# Patient Record
Sex: MALE | Race: WHITE
[De-identification: names, ages, dates, MRNs, and addresses within clinical notes are randomized per-mention and may not be internally consistent; named-entity substitution may affect disease eponyms.]

---

## 2017-04-13 ENCOUNTER — HOSPITAL ENCOUNTER (EMERGENCY)
Age: 52
Discharge: HOME | End: 2017-04-13
Payer: MEDICARE

## 2017-04-13 ENCOUNTER — HOSPITAL ENCOUNTER (OUTPATIENT)
Age: 52
Discharge: HOME | End: 2017-04-13
Payer: MEDICARE
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
## 2017-04-13 DIAGNOSIS — T17.928A: Primary | ICD-10-CM

## 2017-04-13 DIAGNOSIS — S20.211A: ICD-10-CM

## 2017-04-13 DIAGNOSIS — Q90.9: ICD-10-CM

## 2017-04-13 PROCEDURE — 99283 EMERGENCY DEPT VISIT LOW MDM: CPT

## 2017-04-13 PROCEDURE — 99282 EMERGENCY DEPT VISIT SF MDM: CPT

## 2017-04-13 PROCEDURE — 71101 X-RAY EXAM UNILAT RIBS/CHEST: CPT

## 2017-06-13 ENCOUNTER — HOSPITAL ENCOUNTER (OUTPATIENT)
Dept: HOSPITAL 76 - DI | Age: 52
Discharge: HOME | End: 2017-06-13
Attending: INTERNAL MEDICINE
Payer: MEDICARE

## 2017-06-13 DIAGNOSIS — R09.89: Primary | ICD-10-CM

## 2017-06-13 DIAGNOSIS — Q90.9: ICD-10-CM

## 2017-06-13 PROCEDURE — 92611 MOTION FLUOROSCOPY/SWALLOW: CPT

## 2017-06-13 PROCEDURE — 74230 X-RAY XM SWLNG FUNCJ C+: CPT

## 2017-06-13 NOTE — XRAY REPORT
MODIFIED BARIUM SWALLOW:  06/13/2017

 

CLINICAL INDICATION:  Down syndrome, choking. 

 

FINDINGS:  Various consistencies of barium were prepared and administered in conjunction with Speech 
Pathology.  There was no evidence of penetration or aspiration with any administered consistency.  Pl
ease also refer to full report from Speech Pathology for further findings. 

 

IMPRESSION:  NO EVIDENCE OF PENETRATION OR ASPIRATION.

 

FLUOROSCOPY TIME:  40 seconds; 1 spot image obtained (cinefluoroscopy recorded). 

 

 

 

DD:06/13/2017 11:36:39  DT: 06/13/2017 11:51  JOB #: [id number]  EXT JOB #:[id number]

## 2018-02-14 ENCOUNTER — HOSPITAL ENCOUNTER (OUTPATIENT)
Dept: HOSPITAL 76 - LAB.R | Age: 53
Discharge: HOME | End: 2018-02-14
Attending: INTERNAL MEDICINE
Payer: MEDICARE

## 2018-02-14 DIAGNOSIS — E53.8: Primary | ICD-10-CM

## 2018-02-14 DIAGNOSIS — Z79.899: ICD-10-CM

## 2018-02-14 DIAGNOSIS — Q90.9: ICD-10-CM

## 2018-02-14 DIAGNOSIS — E03.9: ICD-10-CM

## 2018-02-14 DIAGNOSIS — D70.9: ICD-10-CM

## 2018-02-14 LAB
ALBUMIN DIAFP-MCNC: 4.2 G/DL (ref 3.2–5.5)
ALBUMIN/GLOB SERPL: 1.4 {RATIO} (ref 1–2.2)
ALP SERPL-CCNC: 50 IU/L (ref 42–121)
ALT SERPL W P-5'-P-CCNC: 16 IU/L (ref 10–60)
ANION GAP SERPL CALCULATED.4IONS-SCNC: 11 MMOL/L (ref 6–13)
AST SERPL W P-5'-P-CCNC: 23 IU/L (ref 10–42)
BASOPHILS # BLD MANUAL: 0.1 10^3/UL (ref 0–0.1)
BASOPHILS NFR BLD AUTO: 0.7 %
BASOPHILS NFR SPEC MANUAL: 4 %
BILIRUB BLD-MCNC: 1.8 MG/DL (ref 0.2–1)
BUN SERPL-MCNC: 14 MG/DL (ref 6–20)
CALCIUM UR-MCNC: 9.3 MG/DL (ref 8.5–10.3)
CHLORIDE SERPL-SCNC: 98 MMOL/L (ref 101–111)
CHOLEST SERPL-MCNC: 163 MG/DL
CO2 SERPL-SCNC: 30 MMOL/L (ref 21–32)
CREAT SERPLBLD-SCNC: 1 MG/DL (ref 0.6–1.2)
EOSINOPHIL # BLD MANUAL: 0 10^3/UL (ref 0–0.7)
EOSINOPHIL NFR BLD AUTO: 0.8 %
ERYTHROCYTE [DISTWIDTH] IN BLOOD BY AUTOMATED COUNT: 13 % (ref 12–15)
GFRSERPLBLD MDRD-ARVRAT: 79 ML/MIN/{1.73_M2} (ref 89–?)
GLOBULIN SER-MCNC: 2.9 G/DL (ref 2.1–4.2)
GLUCOSE SERPL-MCNC: 97 MG/DL (ref 70–100)
HDLC SERPL-MCNC: 60 MG/DL
HDLC SERPL: 2.7 {RATIO} (ref ?–5)
HGB UR QL STRIP: 16.5 G/DL (ref 14–18)
LDLC SERPL CALC-MCNC: 86 MG/DL
LDLC/HDLC SERPL: 1.4 {RATIO} (ref ?–3.6)
LYMPH ABN NFR BLD MANUAL: 0 %
LYMPHOBLASTS # BLD: 20 %
LYMPHOCYTES # BLD MANUAL: 0.9 10^3/UL (ref 1.5–3.5)
LYMPHOCYTES NFR BLD AUTO: 43.7 %
MANUAL DIF COMMENT BLD-IMP: (no result)
MCH RBC QN AUTO: 37.1 PG (ref 27–31)
MCHC RBC AUTO-ENTMCNC: 34.7 G/DL (ref 32–36)
MCV RBC AUTO: 107 FL (ref 80–94)
MONOCYTES # BLD MANUAL: 0.3 10^3/UL (ref 0–1)
MONOCYTES NFR BLD AUTO: 12.6 %
NEUTROPHILS # SNV AUTO: 2.6 X10^3/UL (ref 4.8–10.8)
NEUTROPHILS NFR BLD AUTO: 42.2 %
NEUTROPHILS NFR BLD MANUAL: 1.2 10^3/UL (ref 1.5–6.6)
NEUTS BAND NFR BLD MANUAL: 47 %
NEUTS BAND NFR BLD: 0 %
PDW BLD AUTO: 8.1 FL (ref 7.4–11.4)
PLATELET # BLD: 162 10^3/UL (ref 130–450)
PROT SPEC-MCNC: 7.1 G/DL (ref 6.7–8.2)
RBC MAR: 4.44 10^6/UL (ref 4.7–6.1)
SODIUM SERPLBLD-SCNC: 139 MMOL/L (ref 135–145)
VLDLC SERPL-SCNC: 17 MG/DL

## 2018-02-14 PROCEDURE — 80053 COMPREHEN METABOLIC PANEL: CPT

## 2018-02-14 PROCEDURE — 83721 ASSAY OF BLOOD LIPOPROTEIN: CPT

## 2018-02-14 PROCEDURE — 84443 ASSAY THYROID STIM HORMONE: CPT

## 2018-02-14 PROCEDURE — 85025 COMPLETE CBC W/AUTO DIFF WBC: CPT

## 2018-02-14 PROCEDURE — 80061 LIPID PANEL: CPT

## 2018-11-09 ENCOUNTER — HOSPITAL ENCOUNTER (EMERGENCY)
Dept: HOSPITAL 76 - ED | Age: 53
Discharge: HOME | End: 2018-11-09
Payer: MEDICARE

## 2018-11-09 VITALS — SYSTOLIC BLOOD PRESSURE: 132 MMHG | DIASTOLIC BLOOD PRESSURE: 89 MMHG

## 2018-11-09 DIAGNOSIS — Q90.9: ICD-10-CM

## 2018-11-09 DIAGNOSIS — K59.00: Primary | ICD-10-CM

## 2018-11-09 LAB
CLARITY UR REFRACT.AUTO: CLEAR
GLUCOSE UR QL STRIP.AUTO: NEGATIVE MG/DL
KETONES UR QL STRIP.AUTO: NEGATIVE MG/DL
NITRITE UR QL STRIP.AUTO: NEGATIVE
PH UR STRIP.AUTO: 7 PH (ref 5–7.5)
PROT UR STRIP.AUTO-MCNC: NEGATIVE MG/DL
RBC # UR STRIP.AUTO: NEGATIVE /UL
SP GR UR STRIP.AUTO: 1.01 (ref 1–1.03)
UROBILINOGEN UR QL STRIP.AUTO: (no result) E.U./DL
UROBILINOGEN UR STRIP.AUTO-MCNC: NEGATIVE MG/DL

## 2018-11-09 PROCEDURE — 74018 RADEX ABDOMEN 1 VIEW: CPT

## 2018-11-09 PROCEDURE — 81001 URINALYSIS AUTO W/SCOPE: CPT

## 2018-11-09 PROCEDURE — 81003 URINALYSIS AUTO W/O SCOPE: CPT

## 2018-11-09 PROCEDURE — 87086 URINE CULTURE/COLONY COUNT: CPT

## 2018-11-09 PROCEDURE — 99283 EMERGENCY DEPT VISIT LOW MDM: CPT

## 2018-11-09 PROCEDURE — 51798 US URINE CAPACITY MEASURE: CPT

## 2018-11-09 NOTE — XRAY REPORT
Reason:  supra pubic pain ? stool load

Procedure Date:  11/09/2018   

Accession Number:  [id number] / [id number]                    

Procedure:  XR  - Abdomen 1 View X-Ray CPT Code:  56866

 

FULL RESULT:

 

 

EXAM:

ABDOMEN RADIOGRAPHY

 

EXAM DATE: 11/9/2018 10:29 PM.

 

CLINICAL HISTORY: Supra pubic pain ? Stool load.

 

COMPARISON: None.

 

TECHNIQUE: 1 view.

 

FINDINGS:

Bowel Gas Pattern: Nonobstructive with moderate stool burden.

 

Other: Mild scoliosis.

 

IMPRESSION: Moderate stool burden.

 

RADIA

## 2018-11-09 NOTE — ED PHYSICIAN DOCUMENTATION
PD HPI ABD PAIN





- Stated complaint


Stated Complaint: ABD PX/BK PX/NECK PX





- Chief complaint


Chief Complaint: Abd Pain





- History obtained from


History obtained from: Patient, Family





- History of Present Illness


Timing - onset: Today


Timing - duration: Minutes


Timing - details: Abrupt onset, Still present


Quality: Cramping, Sharp, Pain


Location: Suprapubic


Improved by: Laying still


Associated symptoms: No: Fever, Nausea, Vomiting, Hematemesis, Diarrhea, 

Constipation


Similar symptoms before: Has not had sx before


Recently seen: Not recently seen





- Additional information


Additional information: 





53-year-old male with Down syndrome was at home this evening with his mother 

they were getting ready to go to bed when he complained of severe suprapubic 

pain.  He does not usually complain about anything and the mother has become 

concerned and has brought him here to the hospital.  He seems more comfortable 

now.  The patient himself is a poor historian and usually refers to his mother 

for any details.  He does indicate that he had a bowel movement today and that 

he has been able to urinate.  He is not specific on any details.





Review of Systems


Constitutional: denies: Fever


Eyes: denies: Decreased vision


Ears: denies: Ear pain


Nose: denies: Congestion


Throat: denies: Sore throat


Cardiac: denies: Chest pain / pressure, Palpitations


Respiratory: denies: Dyspnea, Cough


GI: reports: Abdominal Pain.  denies: Nausea, Vomiting, Constipation, Diarrhea


: reports: Incontinent.  denies: Dysuria, Frequency


Skin: denies: Rash


Musculoskeletal: denies: Neck pain





PD PAST MEDICAL HISTORY





- Present Medications


Home Medications: 


                                Ambulatory Orders











 Medication  Instructions  Recorded  Confirmed


 


Levothyroxine Sodium [Synthroid]  11/09/18 














- Allergies


Allergies/Adverse Reactions: 


                                    Allergies











Allergy/AdvReac Type Severity Reaction Status Date / Time


 


ampicillin Allergy  Unknown Verified 11/09/18 22:01














PD ED PE NORMAL





- Vitals


Vital signs reviewed: Yes (diastolic hypertension )





- General


General: No acute distress, Well developed/nourished





- HEENT


HEENT: Atraumatic, PERRL, EOMI





- Neck


Neck: Supple, no meningeal sign





- Cardiac


Cardiac: RRR, No murmur





- Respiratory


Respiratory: No respiratory distress, Clear bilaterally





- Abdomen


Abdomen: Soft, Non tender, Other (bladder is palpable but not tender at the time

 )





- Back


Back: No CVA TTP, No spinal TTP





- Derm


Derm: Normal color, Warm and dry, No rash





- Extremities


Extremities: No deformity, No edema





- Neuro


Neuro: CNs 2-12 intact, No motor deficit, No sensory deficit, Normal speech


Eye Opening: Spontaneous


Motor: Obeys Commands


Verbal: Oriented


GCS Score: 15





- Psych


Psych: Normal mood, Normal affect





Results





- Vitals


Vitals: 


                               Vital Signs - 24 hr











  11/09/18





  21:50


 


Temperature 36.0 C L


 


Heart Rate 76


 


Respiratory 16





Rate 


 


Blood Pressure 127/91 H


 


O2 Saturation 100








                                     Oxygen











O2 Source                      Room air

















- Labs


Labs: 


                                Laboratory Tests











  11/09/18





  22:24


 


Urine Color  YELLOW


 


Urine Clarity  CLEAR


 


Urine pH  7.0


 


Ur Specific Gravity  1.015


 


Urine Protein  NEGATIVE


 


Urine Glucose (UA)  NEGATIVE


 


Urine Ketones  NEGATIVE


 


Urine Occult Blood  NEGATIVE


 


Urine Nitrite  NEGATIVE


 


Urine Bilirubin  NEGATIVE


 


Urine Urobilinogen  0.2 (NORMAL)


 


Ur Leukocyte Esterase  NEGATIVE


 


Ur Microscopic Review  NOT INDICATED


 


Urine Culture Comments  NOT INDICATED














- Rads (name of study)


  ** 1 view abdomen


Radiology: Prelim report reviewed (Impression: Moderate stool burden.), EMP read

 indepedently, See rad report





Procedures





- Bedside sono


Bedside sono by EMP: 





With with use of bedside ultrasound the bladder is imaged he does not appear to 

be tender but the bladder is full.  A bedside bladder scanner is used for 

quantitation with about 500 mls of urine present.





PD MEDICAL DECISION MAKING





- ED course


Complexity details: reviewed results, re-evaluated patient, considered 

differential, d/w patient, d/w family


ED course: 





53-year-old male with Down syndrome has developed suprapubic pain this evening 

that was severe enough that he asked his mother to come to the hospital.  On 

arrival to the emerge department he does not appear to be in any discomfort and 

on examination he has a nontender abdomen my initial concern was for the 

possibility of urinary retention as the patient did have a palpable bladder.  

This was shown to have about 500 mils of urine in it by bladder scanning and the

 patient was able to void over 300 mils an the urine was without evidence of 

infection.  The patient is unable to give an adequate history about his bowel 

movements and a 1 view x-ray of the abdomen shows a moderate stool burden.  I 

suspect his symptoms are due to constipation.





Departure





- Departure


Disposition: 01 Home, Self Care


Clinical Impression: 


Constipation


Qualifiers:


 Constipation type: unspecified constipation type Qualified Code(s): K59.00 - 

Constipation, unspecified





Condition: Stable


Instructions:  ED Constipation


Follow-Up: 


Helio Dsouza MD [Primary Care Provider] -

## 2018-11-17 ENCOUNTER — HOSPITAL ENCOUNTER (OUTPATIENT)
Dept: HOSPITAL 76 - EMS | Age: 53
End: 2018-11-17
Attending: SURGERY
Payer: MEDICARE

## 2018-11-17 DIAGNOSIS — R09.89: Primary | ICD-10-CM

## 2019-04-07 ENCOUNTER — HOSPITAL ENCOUNTER (OUTPATIENT)
Dept: HOSPITAL 76 - LAB | Age: 54
Discharge: HOME | End: 2019-04-07
Attending: FAMILY MEDICINE
Payer: MEDICARE

## 2019-04-07 ENCOUNTER — HOSPITAL ENCOUNTER (OUTPATIENT)
Dept: HOSPITAL 76 - LAB.R | Age: 54
Discharge: HOME | End: 2019-04-07
Attending: FAMILY MEDICINE
Payer: MEDICARE

## 2019-04-07 DIAGNOSIS — E03.9: ICD-10-CM

## 2019-04-07 DIAGNOSIS — E53.8: Primary | ICD-10-CM

## 2019-04-07 DIAGNOSIS — Q90.9: ICD-10-CM

## 2019-04-07 LAB
ALBUMIN DIAFP-MCNC: 3.9 G/DL (ref 3.2–5.5)
ALBUMIN/GLOB SERPL: 1.2 {RATIO} (ref 1–2.2)
ALP SERPL-CCNC: 51 IU/L (ref 42–121)
ALT SERPL W P-5'-P-CCNC: 19 IU/L (ref 10–60)
ANION GAP SERPL CALCULATED.4IONS-SCNC: 8 MMOL/L (ref 6–13)
AST SERPL W P-5'-P-CCNC: 23 IU/L (ref 10–42)
BASOPHILS NFR BLD AUTO: 0 10^3/UL (ref 0–0.1)
BASOPHILS NFR BLD AUTO: 0.2 %
BILIRUB BLD-MCNC: 1.8 MG/DL (ref 0.2–1)
BUN SERPL-MCNC: 14 MG/DL (ref 6–20)
CALCIUM UR-MCNC: 9 MG/DL (ref 8.5–10.3)
CHLORIDE SERPL-SCNC: 101 MMOL/L (ref 101–111)
CO2 SERPL-SCNC: 31 MMOL/L (ref 21–32)
CREAT SERPLBLD-SCNC: 1 MG/DL (ref 0.6–1.2)
EOSINOPHIL # BLD AUTO: 0 10^3/UL (ref 0–0.7)
EOSINOPHIL NFR BLD AUTO: 0.5 %
ERYTHROCYTE [DISTWIDTH] IN BLOOD BY AUTOMATED COUNT: 13.7 % (ref 12–15)
GFRSERPLBLD MDRD-ARVRAT: 78 ML/MIN/{1.73_M2} (ref 89–?)
GLOBULIN SER-MCNC: 3.2 G/DL (ref 2.1–4.2)
GLUCOSE SERPL-MCNC: 91 MG/DL (ref 70–100)
HGB UR QL STRIP: 16.3 G/DL (ref 14–18)
LYMPHOCYTES # SPEC AUTO: 1.4 10^3/UL (ref 1.5–3.5)
LYMPHOCYTES NFR BLD AUTO: 34.2 %
MCH RBC QN AUTO: 36.7 PG (ref 27–31)
MCHC RBC AUTO-ENTMCNC: 34.5 G/DL (ref 32–36)
MCV RBC AUTO: 106.4 FL (ref 80–94)
MONOCYTES # BLD AUTO: 0.4 10^3/UL (ref 0–1)
MONOCYTES NFR BLD AUTO: 9.4 %
NEUTROPHILS # BLD AUTO: 2.4 10^3/UL (ref 1.5–6.6)
NEUTROPHILS # SNV AUTO: 4.2 X10^3/UL (ref 4.8–10.8)
NEUTROPHILS NFR BLD AUTO: 55.7 %
PDW BLD AUTO: 8.1 FL (ref 7.4–11.4)
PLATELET # BLD: 163 10^3/UL (ref 130–450)
PROT SPEC-MCNC: 7.1 G/DL (ref 6.7–8.2)
RBC MAR: 4.44 10^6/UL (ref 4.7–6.1)
SODIUM SERPLBLD-SCNC: 140 MMOL/L (ref 135–145)
T4 FREE SERPL-MCNC: 0.95 NG/DL (ref 0.58–1.64)
TSH SERPL-ACNC: 1.17 UIU/ML (ref 0.34–5.6)

## 2019-04-07 PROCEDURE — 84439 ASSAY OF FREE THYROXINE: CPT

## 2019-04-07 PROCEDURE — 80053 COMPREHEN METABOLIC PANEL: CPT

## 2019-04-07 PROCEDURE — 84481 FREE ASSAY (FT-3): CPT

## 2019-04-07 PROCEDURE — 84443 ASSAY THYROID STIM HORMONE: CPT

## 2019-04-07 PROCEDURE — 85025 COMPLETE CBC W/AUTO DIFF WBC: CPT

## 2019-04-07 PROCEDURE — 36415 COLL VENOUS BLD VENIPUNCTURE: CPT

## 2021-04-22 ENCOUNTER — HOSPITAL ENCOUNTER (OUTPATIENT)
Dept: HOSPITAL 76 - EMS | Age: 56
End: 2021-04-22
Payer: MEDICARE

## 2021-04-22 DIAGNOSIS — Z03.89: Primary | ICD-10-CM

## 2021-04-27 ENCOUNTER — HOSPITAL ENCOUNTER (EMERGENCY)
Dept: HOSPITAL 76 - ED | Age: 56
LOS: 2 days | Discharge: HOME | End: 2021-04-29
Payer: MEDICARE

## 2021-04-27 ENCOUNTER — HOSPITAL ENCOUNTER (OUTPATIENT)
Dept: HOSPITAL 76 - EMS | Age: 56
Discharge: ANOTHER HOSPITAL | End: 2021-04-27
Payer: MEDICARE

## 2021-04-27 DIAGNOSIS — R53.1: Primary | ICD-10-CM

## 2021-04-27 DIAGNOSIS — R26.2: ICD-10-CM

## 2021-04-27 DIAGNOSIS — R09.81: ICD-10-CM

## 2021-04-27 DIAGNOSIS — Q90.9: ICD-10-CM

## 2021-04-27 DIAGNOSIS — R26.81: ICD-10-CM

## 2021-04-27 DIAGNOSIS — Z20.822: ICD-10-CM

## 2021-04-27 LAB
ALBUMIN DIAFP-MCNC: 3.8 G/DL (ref 3.2–5.5)
ALBUMIN/GLOB SERPL: 1.2 {RATIO} (ref 1–2.2)
ALP SERPL-CCNC: 53 IU/L (ref 42–121)
ALT SERPL W P-5'-P-CCNC: 15 IU/L (ref 10–60)
ANION GAP SERPL CALCULATED.4IONS-SCNC: 9 MMOL/L (ref 6–13)
AST SERPL W P-5'-P-CCNC: 21 IU/L (ref 10–42)
BASOPHILS NFR BLD AUTO: 0.1 10^3/UL (ref 0–0.1)
BASOPHILS NFR BLD AUTO: 1.4 %
BILIRUB BLD-MCNC: 1.4 MG/DL (ref 0.2–1)
BUN SERPL-MCNC: 17 MG/DL (ref 6–20)
CALCIUM UR-MCNC: 9.3 MG/DL (ref 8.5–10.3)
CHLORIDE SERPL-SCNC: 105 MMOL/L (ref 101–111)
CO2 SERPL-SCNC: 30 MMOL/L (ref 21–32)
CREAT SERPLBLD-SCNC: 0.9 MG/DL (ref 0.6–1.2)
EOSINOPHIL # BLD AUTO: 0 10^3/UL (ref 0–0.7)
EOSINOPHIL NFR BLD AUTO: 0.5 %
ERYTHROCYTE [DISTWIDTH] IN BLOOD BY AUTOMATED COUNT: 12.4 % (ref 12–15)
ETHANOL BLD-MCNC: < 5 MG/DL
GFRSERPLBLD MDRD-ARVRAT: 88 ML/MIN/{1.73_M2} (ref 89–?)
GLOBULIN SER-MCNC: 3.1 G/DL (ref 2.1–4.2)
GLUCOSE SERPL-MCNC: 94 MG/DL (ref 70–100)
HCT VFR BLD AUTO: 44.2 % (ref 42–52)
HGB UR QL STRIP: 15.3 G/DL (ref 14–18)
LIPASE SERPL-CCNC: 29 U/L (ref 22–51)
LYMPHOCYTES # SPEC AUTO: 1.1 10^3/UL (ref 1.5–3.5)
LYMPHOCYTES NFR BLD AUTO: 26.4 %
MAGNESIUM SERPL-MCNC: 2.3 MG/DL (ref 1.7–2.8)
MCH RBC QN AUTO: 37.2 PG (ref 27–31)
MCHC RBC AUTO-ENTMCNC: 34.6 G/DL (ref 32–36)
MCV RBC AUTO: 107.5 FL (ref 80–94)
MONOCYTES # BLD AUTO: 0.5 10^3/UL (ref 0–1)
MONOCYTES NFR BLD AUTO: 10.9 %
NEUTROPHILS # BLD AUTO: 2.6 10^3/UL (ref 1.5–6.6)
NEUTROPHILS # SNV AUTO: 4.2 X10^3/UL (ref 4.8–10.8)
NEUTROPHILS NFR BLD AUTO: 60.6 %
NRBC # BLD AUTO: 0 /100WBC
NRBC # BLD AUTO: 0 X10^3/UL
PDW BLD AUTO: 9.5 FL (ref 7.4–11.4)
PLATELET # BLD: 176 10^3/UL (ref 130–450)
POTASSIUM SERPL-SCNC: 3.7 MMOL/L (ref 3.5–5)
PROT SPEC-MCNC: 6.9 G/DL (ref 6.7–8.2)
RBC MAR: 4.11 10^6/UL (ref 4.7–6.1)
SODIUM SERPLBLD-SCNC: 144 MMOL/L (ref 135–145)

## 2021-04-27 PROCEDURE — 0202U NFCT DS 22 TRGT SARS-COV-2: CPT

## 2021-04-27 PROCEDURE — 83735 ASSAY OF MAGNESIUM: CPT

## 2021-04-27 PROCEDURE — 85025 COMPLETE CBC W/AUTO DIFF WBC: CPT

## 2021-04-27 PROCEDURE — 87631 RESP VIRUS 3-5 TARGETS: CPT

## 2021-04-27 PROCEDURE — 83690 ASSAY OF LIPASE: CPT

## 2021-04-27 PROCEDURE — 99284 EMERGENCY DEPT VISIT MOD MDM: CPT

## 2021-04-27 PROCEDURE — 70450 CT HEAD/BRAIN W/O DYE: CPT

## 2021-04-27 PROCEDURE — 84443 ASSAY THYROID STIM HORMONE: CPT

## 2021-04-27 PROCEDURE — 80053 COMPREHEN METABOLIC PANEL: CPT

## 2021-04-27 PROCEDURE — 80320 DRUG SCREEN QUANTALCOHOLS: CPT

## 2021-04-27 PROCEDURE — 71045 X-RAY EXAM CHEST 1 VIEW: CPT

## 2021-04-27 PROCEDURE — 83880 ASSAY OF NATRIURETIC PEPTIDE: CPT

## 2021-04-27 PROCEDURE — 36415 COLL VENOUS BLD VENIPUNCTURE: CPT

## 2021-04-27 NOTE — CT REPORT
PROCEDURE:  HEAD WO

 

INDICATIONS:  Trauma, fall few days ago, more restless/weak

 

TECHNIQUE:  

Noncontrast 4.5 mm thick angled axial sections acquired from the foramen magnum to the vertex.  For r
adiation dose reduction, the following was used:  automated exposure control, adjustment of mA and/or
 kV according to patient size.

 

COMPARISON:  None.

 

FINDINGS:  

Image quality:  Excellent.  

 

CSF spaces:  Basal cisterns are patent.  No extra-axial fluid collections.  Ventricles are normal in 
size and shape.  

 

Brain:  Encephalomalacia in the right anterior temporal lobe with gliosis. No midline shift.  No intr
acranial masses or hemorrhage.  Gray-white matter interface is normal.  

 

Skull and face:  Calvarium and visualized facial bones are intact, without suspicious lesions.  

 

Sinuses:  Visualized sinuses and mastoids are clear.  

 

IMPRESSION:  No acute finding.

 

Reviewed by: Vinnie Ramirez MD on 4/27/2021 12:36 PM PDT

Approved by: Vinnie Ramirez MD on 4/27/2021 12:36 PM PDT

 

 

Station ID:  [id number]

## 2021-04-27 NOTE — ED PHYSICIAN DOCUMENTATION
PD HPI ALTERED MENTAL STATUS





- Stated complaint


Stated Complaint: INCREASED WEAKNESS





- History obtained from


History obtained from: Patient, Family (mom), EMS





- History of Present Illness


Timing - onset: How many days ago (The patient has had general decline in 

strength with difficulty ambulating without assistance for several weeks or 

more.  Its more pronounced the last week and he has been unable to get up even 

with assistance yesterday and today. Mom called EMS. No URI/illness per se.)


Timing - duration: Days, Weeks (Mom has seen him have a general decline for 

months or more and more pronounced the last few weeks.  She had been talking 

with  at [facility] to try to get him placed there and is 

still in the paperwork and medical evaluation process.  He was much weaker the 

last couple of days.)


Timing - details: Gradual onset, Still present


Quality / character: Other (general weakness and not interacting as briskly.).  

No: Agitated, Combative


Associated symptoms: No: Fever, Headache, NVD, Focal weakness


Contributing factors: Other (Downs syndrome, lives with mom).  No: Diabetic, R

ecent med change, Recent illness, Recent injury


Basline status: Walker.  No: Alert and oriented X 3 (typically simple verbal 

vocabulary and concepts, but interacts and follows command)


Treatment PTA: Accucheck


Similar symptoms before: Has not had sx before


Recently seen: Not recently seen (Mom states he has been unable to ambulate well

enouth to get to the doctor's office, so has not seen his primary care for a 

year or so.)





Review of Systems


Unable to obtain: Other (info from mom)


Constitutional: denies: Fever


Nose: reports: Rhinorrhea / runny nose, Congestion


Throat: reports: Sore throat


Respiratory: reports: Cough


GI: denies: Vomiting, Diarrhea


: reports: Frequency, Incontinent


Skin: denies: Rash


Neurologic: reports: Generalized weakness, Head injury (he has fallen trying to 

walk several times in the past few days.).  denies: Focal weakness, Near 

syncope, Headache


Endocrine: denies: Weight loss





PD PAST MEDICAL HISTORY





- Past Medical History


Cardiovascular: None


Respiratory: None


Neuro: Other (Down SYndrome.)


Endocrine/Autoimmune: HyPOthyroidism


GI: None


Psych: Other





- Past Surgical History


General: Hiatal hernia repair





- Present Medications


Home Medications: 


                                Ambulatory Orders











 Medication  Instructions  Recorded  Confirmed


 


Levothyroxine Sodium [Synthroid] 75 mcg PO DAILY 11/09/18 04/27/21


 


Cyanocobalamin (Vitamin B-12) 1 tab PO DAILY 04/27/21 04/27/21





[Vitamin B-12]   


 


Docusate Sodium 100Mg Capsule 200 mg PO DAILY 04/27/21 04/27/21





[Colace 100Mg Capsule]   


 


Multivit,Calc,Min/FA/K1/Lycop 1 tab PO DAILY 04/27/21 04/27/21





[One-A-Day Men's Complete Tab]   














- Allergies


Allergies/Adverse Reactions: 


                                    Allergies











Allergy/AdvReac Type Severity Reaction Status Date / Time


 


ampicillin Allergy  Unknown Verified 11/09/18 22:01














- Living Situation


Living Situation: reports: With family (mom and a cregiver)


Living Arrangement: reports: At home





- Social History


Does the pt smoke?: No


Smoking Status: Never smoker


Does the pt drink ETOH?: No





- Immunizations


Immunizations are current?: Yes





- POLST


Patient has POLST: No





PD ED PE NORMAL





- Vitals


Vital signs reviewed: Yes





- General


General: No acute distress, Well developed/nourished, Other (attentive and does 

answer very simply yes/no. Follows commands. )





- HEENT


HEENT: Atraumatic, Pharynx benign, Other





- Neck


Neck: Supple, no meningeal sign, No adenopathy





- Cardiac


Cardiac: RRR, No murmur





- Respiratory


Respiratory: Clear bilaterally





- Abdomen


Abdomen: Soft, Non tender, Non distended





- Back


Back: No CVA TTP





- Derm


Derm: Normal color, Warm and dry





- Extremities


Extremities: No tenderness to palpate, No edema, No calf tenderness / cord





- Neuro


Neuro: No motor deficit


Eye Opening: Spontaneous


Motor: Obeys Commands





Results





- Vitals


Vitals: 


                               Vital Signs - 24 hr











  04/27/21 04/27/21 04/27/21





  10:28 10:53 12:39


 


Temperature 36.5 C  


 


Heart Rate 93  68


 


Respiratory  16 18





Rate   


 


Blood Pressure  142/81 H 151/113 H


 


O2 Saturation 97  100














  04/27/21 04/27/21





  14:20 17:00


 


Temperature  


 


Heart Rate 66 69


 


Respiratory 18 14





Rate  


 


Blood Pressure  154/98 H


 


O2 Saturation 100 99








                                     Oxygen











O2 Source                      Room air

















- Labs


Labs: 


                                Laboratory Tests











  04/27/21 04/27/21 04/27/21





  11:41 11:41 11:41


 


WBC  4.2 L  


 


RBC  4.11 L  


 


Hgb  15.3  


 


Hct  44.2  


 


MCV  107.5 H  


 


MCH  37.2 H  


 


MCHC  34.6  


 


RDW  12.4  


 


Plt Count  176  


 


MPV  9.5  


 


Neut # (Auto)  2.6  


 


Lymph # (Auto)  1.1 L  


 


Mono # (Auto)  0.5  


 


Eos # (Auto)  0.0  


 


Baso # (Auto)  0.1  


 


Absolute Nucleated RBC  0.00  


 


Nucleated RBC %  0.0  


 


Sodium   144 


 


Potassium   3.7 


 


Chloride   105 


 


Carbon Dioxide   30 


 


Anion Gap   9.0 


 


BUN   17 


 


Creatinine   0.9 


 


Estimated GFR (MDRD)   88 L 


 


Glucose   94 


 


Calcium   9.3 


 


Magnesium   2.3 


 


Total Bilirubin   1.4 H 


 


AST   21 


 


ALT   15 


 


Alkaline Phosphatase   53 


 


Total Protein   6.9 


 


Albumin   3.8 


 


Globulin   3.1 


 


Albumin/Globulin Ratio   1.2 


 


Lipase   29 


 


TSH    3.27


 


Ethyl Alcohol   < 5.0 














- Rads (name of study)


  ** head CT


Radiology: Prelim report reviewed (no acute process), See rad report





  ** chest xray


Radiology: Prelim report reviewed (no acute process), See rad report





PD MEDICAL DECISION MAKING





- ED course


Complexity details: reviewed results, re-evaluated patient, considered 

differential (The patient has had increasing general weakness and difficulty 

with ambulating for several months and more pronounced in the last few weeks.  

The mom and caregiver had realized they are having difficulty and the patient 

would need extended care facility. Mom had been looking into [facility].), d/w patient, d/w family (mom)


ED course: 





There is no obvious acute process at the moment.  He still is yet to give a 

urine sample however.  Social work talked with mom and [facility] and 

they are evaluating the patient for placement there.  Mom states she is unable 

to care for him anymore at home.  He will stay here in the ER overnight with 

further evaluation by social work tomorrow and hopefully placement.





Departure





- Departure


Clinical Impression: 


 General weakness, Trisomy 21, Need for extended care facility





Condition: Stable


Record reviewed to determine appropriate education?: Yes
Full range of motion of upper and lower extremities, no joint tenderness/swelling.

## 2021-04-27 NOTE — XRAY REPORT
PROCEDURE:  Chest 1 View X-Ray

 

INDICATIONS:  weakness few days.

 

TECHNIQUE:  One view of the chest was acquired.  

 

COMPARISON:  Rib x-ray for/13/17

 

FINDINGS:  

 

Surgical changes and devices:  None.  

 

Lungs and pleura:  No pleural effusions or pneumothorax.  Lungs are clear.  

 

Mediastinum:  Mediastinal contours appear normal.  Heart size is normal.  

 

Bones and chest wall:  No suspicious bony lesions.  Overlying soft tissues appear unremarkable.  

 

IMPRESSION:  

No acute pulmonary process.

 

Reviewed by: Larissa Villatoro MD on 4/27/2021 10:53 AM [facility]

Approved by: Larissa Villatoro MD on 4/27/2021 10:53 AM [facility]

 

 

Station ID:  [id number]

## 2021-04-28 LAB
B PARAPERT DNA SPEC QL NAA+PROBE: NOT DETECTED
B PERT DNA SPEC QL NAA+PROBE: NOT DETECTED
C PNEUM DNA NPH QL NAA+NON-PROBE: NOT DETECTED
FLUAV RNA RESP QL NAA+PROBE: NOT DETECTED
HAEM INFLU B DNA SPEC QL NAA+PROBE: NOT DETECTED
HCOV 229E RNA SPEC QL NAA+PROBE: NOT DETECTED
HCOV HKU1 RNA UPPER RESP QL NAA+PROBE: NOT DETECTED
HCOV NL63 RNA ASPIRATE QL NAA+PROBE: NOT DETECTED
HCOV OC43 RNA SPEC QL NAA+PROBE: NOT DETECTED
HMPV AG SPEC QL: NOT DETECTED
HPIV1 RNA NPH QL NAA+PROBE: NOT DETECTED
HPIV2 SPEC QL CULT: NOT DETECTED
HPIV3 AB TITR SER CF: NOT DETECTED {TITER}
HPIV4 RNA SPEC QL NAA+PROBE: NOT DETECTED
M PNEUMO DNA SPEC QL NAA+PROBE: NOT DETECTED
RSV RNA RESP QL NAA+PROBE: NOT DETECTED
RV+EV RNA SPEC QL NAA+PROBE: NOT DETECTED
SARS-COV-2 RNA PNL SPEC NAA+PROBE: NOT DETECTED

## 2021-04-29 ENCOUNTER — HOSPITAL ENCOUNTER (OUTPATIENT)
Dept: HOSPITAL 76 - EMS | Age: 56
Discharge: OTHER INSTITUTION | End: 2021-04-29
Attending: EMERGENCY MEDICINE
Payer: MEDICARE

## 2021-04-29 VITALS — SYSTOLIC BLOOD PRESSURE: 136 MMHG | DIASTOLIC BLOOD PRESSURE: 93 MMHG

## 2021-04-29 DIAGNOSIS — R41.9: ICD-10-CM

## 2021-04-29 DIAGNOSIS — R26.2: ICD-10-CM

## 2021-04-29 DIAGNOSIS — R53.1: Primary | ICD-10-CM

## 2021-04-29 DIAGNOSIS — Q90.9: ICD-10-CM
